# Patient Record
Sex: MALE | Race: OTHER | HISPANIC OR LATINO | URBAN - METROPOLITAN AREA
[De-identification: names, ages, dates, MRNs, and addresses within clinical notes are randomized per-mention and may not be internally consistent; named-entity substitution may affect disease eponyms.]

---

## 2023-12-14 ENCOUNTER — TELEPHONE (OUTPATIENT)
Dept: FAMILY MEDICINE CLINIC | Facility: CLINIC | Age: 28
End: 2023-12-14

## 2023-12-14 NOTE — TELEPHONE ENCOUNTER
LVM for patient to come in 15 minutes earlier than scheduled appointment and to bring in   ID and Insurance card.   If no insurance card SFS application will be provided    Patient can be reached at [phone]